# Patient Record
Sex: FEMALE
[De-identification: names, ages, dates, MRNs, and addresses within clinical notes are randomized per-mention and may not be internally consistent; named-entity substitution may affect disease eponyms.]

---

## 2023-01-31 ENCOUNTER — APPOINTMENT (OUTPATIENT)
Dept: GERIATRICS | Facility: CLINIC | Age: 83
End: 2023-01-31
Payer: MEDICARE

## 2023-01-31 VITALS
HEART RATE: 71 BPM | SYSTOLIC BLOOD PRESSURE: 199 MMHG | DIASTOLIC BLOOD PRESSURE: 92 MMHG | OXYGEN SATURATION: 98 % | HEIGHT: 57 IN | BODY MASS INDEX: 25.24 KG/M2 | TEMPERATURE: 97.2 F | WEIGHT: 117 LBS | RESPIRATION RATE: 16 BRPM

## 2023-01-31 DIAGNOSIS — Z86.79 PERSONAL HISTORY OF OTHER DISEASES OF THE CIRCULATORY SYSTEM: ICD-10-CM

## 2023-01-31 DIAGNOSIS — Z80.1 FAMILY HISTORY OF MALIGNANT NEOPLASM OF TRACHEA, BRONCHUS AND LUNG: ICD-10-CM

## 2023-01-31 PROCEDURE — 99204 OFFICE O/P NEW MOD 45 MIN: CPT | Mod: GC

## 2023-01-31 NOTE — REVIEW OF SYSTEMS
[Eyesight Problems] : eyesight problems [Loss Of Hearing] : hearing loss [As Noted in HPI] : as noted in HPI [Negative] : Heme/Lymph [FreeTextEntry3] : W

## 2023-01-31 NOTE — REASON FOR VISIT
[Initial Evaluation] : an initial evaluation [Family Member] : family member [FreeTextEntry1] : Establish care [FreeTextEntry2] : Isaias Howell

## 2023-01-31 NOTE — ASSESSMENT
[FreeTextEntry1] : Check blood work and CT head without contrast \par RTO in 2 weeks for cognitive evaluation and blood pressure check. Address advanced directions again at next visit. Daughter reports history of refusal in preventative health studies and vaccinations - will continue to encourage.

## 2023-01-31 NOTE — END OF VISIT
[] : Fellow [Time Spent: ___ minutes] : I have spent [unfilled] minutes of time on the encounter. [FreeTextEntry3] : 82 year old woman w/ PMH as above presents for initial visit. First visit w/ physician in >10 years. Collateral hx from daughter, Lynda, who lives with patient. \par \par Visit prompted by episode of wandering a few weeks ago. Patient reportedly went for a walk and did not return. Police were called and she was found a few miles away.\par \par Reportedly has had increased confusion over the past year but has not seen physician for evaluation. \par \par Patient has no acute complaints today. \par \par Reviewed labs. Has Cr 1.38 w/ BUN 19. Suspect likely CKDIII, but no baseline for comparison. UA shows no evidence of glomerulonephritis or ATN, overall bland. Does have significantly elevated .\par \par Noted hypertensive urgency w/ SBP >180 but no clinical evidence of hypertensive emergency. Start norvasc 5 mg w/ goal 25% BP reduction. Will need home BP monitoring and close f/u in 1-2 weeks. Also will start statin therapy.\par \par Also rec to complete CT head. MRI brain is reasonable, but daughter concerned she will not be able to tolerate. Will revisit at f/u visit, when cognitive assessment to be completed.\par \par Short term f/u w/ Dr. Clancy. Plan of care reviewed w/ daughter in detail.

## 2023-01-31 NOTE — HISTORY OF PRESENT ILLNESS
[Completely Independent] : Completely independent. [Independent] : managing finances [] : Assistance needed with traveling/transport [No falls in past year] : Patient reported no falls in the past year [0] : 2) Feeling down, depressed, or hopeless: Not at all (0) [PHQ-2 Negative - No further assessment needed] : PHQ-2 Negative - No further assessment needed [Patient/Caregiver unclear of wishes] : , patient/caregiver unclear of wishes [FreeTextEntry1] : 82F with PMHX of HTN presents to hospitals care. She is accompanied by her daughter, Lynda, who provided collateral history. \par \par Her daughter reports that the patient has not seen a physician in >15 years. She never had any surgeries and does not take any medications.\par \par Ten days ago, the patient went for her daily walk and did not return home for over two hours - her daughter called the police and the patient was found 3 miles from home after she "got lost." Her blood pressure was "200/100" but patient refused to go to ED. Her daughter notes the patient's memory has gradually worsened over the years - more forgetful and difficulty with word finding. \par \par Over 20 years ago, the patient was told she had HTN - was started on medication (her daughter is unsure of the name) but her blood pressure would become "too low" - medications were later self-discontinued. \par \par SHx: She has lived with daughter, Lynda, over 25 years. Also has two sons, who are both not in [place]. Has six grandchildren. . 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 Never smoker. Denies alcohol use/illicit drug use. History of exposure to second hand smoke. [place]kin[place]. Her room in basement so the patient climbs stairs several times a day- no assistive device for ambulation. Not driving. \par \par FHx: Lung CA - Father/?Mother\par \par Immunizations: Daughter reports that she received one dose of the Pfizer Covid vaccine - refused further doses.  [BreastSonogramDate] : Due [PapSmeardate] : Due [BoneDensityDate] : Due [ColonoscopyDate] : Due [TextBox_19] : Daughter reports she never had one [FreeTextEntry4] : Daughter reports she had one "many years ago." [Driving Concerns] : not driving or driving without noted concerns [de-identified] : She does not drive [AdvancecareDate] : 01/23

## 2023-01-31 NOTE — PHYSICAL EXAM
[Alert] : alert [No Acute Distress] : in no acute distress [Sclera] : the sclera and conjunctiva were normal [EOMI] : extraocular movements were intact [Normal Oral Mucosa] : normal oral mucosa [No Oral Pallor] : no oral pallor [Normal Outer Ear/Nose] : the ears and nose were normal in appearance [Both Tympanic Membranes Were Examined] : both tympanic membranes were normal [Normal Lips/Gums] : the lips and gums were normal [Oropharynx] : the oropharynx was normal [Normal Appearance] : the appearance of the neck was normal [Supple] : the neck was supple [No Respiratory Distress] : no respiratory distress [No Acc Muscle Use] : no accessory muscle use [Respiration, Rhythm And Depth] : normal respiratory rhythm and effort [Auscultation Breath Sounds / Voice Sounds] : lungs were clear to auscultation bilaterally [Normal S1, S2] : normal S1 and S2 [Murmurs] : no murmurs [Heart Rate And Rhythm] : heart rate was normal and rhythm regular [Edema] : edema was not present [Pedal Pulses Normal] : the pedal pulses are present [Bowel Sounds] : normal bowel sounds [Abdomen Tenderness] : non-tender [Abdomen Soft] : soft [Cervical Lymph Nodes Enlarged Posterior Bilaterally] : posterior cervical [Supraclavicular Lymph Nodes Enlarged Bilaterally] : supraclavicular [Cervical Lymph Nodes Enlarged Anterior Bilaterally] : anterior cervical, supraclavicular [Axillary Lymph Nodes Enlarged Bilaterally] : axillary [No Spinal Tenderness] : no spinal tenderness [Normal Gait] : normal gait [No Clubbing, Cyanosis] : no clubbing or cyanosis of the fingernails [Involuntary Movements] : no involuntary movements were seen [Motor Tone] : muscle strength and tone were normal [Normal Color / Pigmentation] : normal skin color and pigmentation [No Focal Deficits] : no focal deficits [Normal Affect] : the affect was normal [Normal Mood] : the mood was normal [Normal Hearing] : hearing was not normal [FreeTextEntry1] : Left central retinal vein swelling  [de-identified] : Hard of hearing - no cerumen impaction

## 2023-02-02 ENCOUNTER — TRANSCRIPTION ENCOUNTER (OUTPATIENT)
Age: 83
End: 2023-02-02

## 2023-02-02 LAB
ALBUMIN SERPL ELPH-MCNC: 4 G/DL
ALP BLD-CCNC: 64 U/L
ALT SERPL-CCNC: 18 U/L
ANION GAP SERPL CALC-SCNC: 12 MMOL/L
APPEARANCE: CLEAR
AST SERPL-CCNC: 20 U/L
BASOPHILS # BLD AUTO: 0.13 K/UL
BASOPHILS NFR BLD AUTO: 1.8 %
BILIRUB SERPL-MCNC: 0.5 MG/DL
BILIRUBIN URINE: NEGATIVE
BLOOD URINE: NEGATIVE
BUN SERPL-MCNC: 19 MG/DL
CALCIUM SERPL-MCNC: 9.6 MG/DL
CHLORIDE SERPL-SCNC: 103 MMOL/L
CHOLEST SERPL-MCNC: 264 MG/DL
CO2 SERPL-SCNC: 22 MMOL/L
COLOR: YELLOW
CREAT SERPL-MCNC: 1.38 MG/DL
EGFR: 38 ML/MIN/1.73M2
EOSINOPHIL # BLD AUTO: 0.34 K/UL
EOSINOPHIL NFR BLD AUTO: 4.7 %
FOLATE SERPL-MCNC: 17.2 NG/ML
GLUCOSE QUALITATIVE U: NEGATIVE
GLUCOSE SERPL-MCNC: 148 MG/DL
HCT VFR BLD CALC: 40.9 %
HDLC SERPL-MCNC: 53 MG/DL
HGB BLD-MCNC: 13.6 G/DL
IMM GRANULOCYTES NFR BLD AUTO: 0.3 %
KETONES URINE: NEGATIVE
LDLC SERPL CALC-MCNC: 185 MG/DL
LEUKOCYTE ESTERASE URINE: NEGATIVE
LYMPHOCYTES # BLD AUTO: 1.78 K/UL
LYMPHOCYTES NFR BLD AUTO: 24.5 %
MAN DIFF?: NORMAL
MCHC RBC-ENTMCNC: 28.7 PG
MCHC RBC-ENTMCNC: 33.3 GM/DL
MCV RBC AUTO: 86.3 FL
MONOCYTES # BLD AUTO: 0.71 K/UL
MONOCYTES NFR BLD AUTO: 9.8 %
NEUTROPHILS # BLD AUTO: 4.3 K/UL
NEUTROPHILS NFR BLD AUTO: 58.9 %
NITRITE URINE: NEGATIVE
NONHDLC SERPL-MCNC: 211 MG/DL
PH URINE: 5.5
PLATELET # BLD AUTO: 446 K/UL
POTASSIUM SERPL-SCNC: 4.5 MMOL/L
PROT SERPL-MCNC: 7.3 G/DL
PROTEIN URINE: NORMAL
RBC # BLD: 4.74 M/UL
RBC # FLD: 13.1 %
SODIUM SERPL-SCNC: 137 MMOL/L
SPECIFIC GRAVITY URINE: 1.02
TRIGL SERPL-MCNC: 132 MG/DL
TSH SERPL-ACNC: 0.97 UIU/ML
UROBILINOGEN URINE: NORMAL
VIT B12 SERPL-MCNC: 482 PG/ML
WBC # FLD AUTO: 7.28 K/UL

## 2023-02-21 ENCOUNTER — APPOINTMENT (OUTPATIENT)
Dept: GERIATRICS | Facility: CLINIC | Age: 83
End: 2023-02-21
Payer: MEDICARE

## 2023-02-21 VITALS
RESPIRATION RATE: 16 BRPM | OXYGEN SATURATION: 98 % | WEIGHT: 16.5 LBS | HEIGHT: 57 IN | BODY MASS INDEX: 3.56 KG/M2 | HEART RATE: 80 BPM | SYSTOLIC BLOOD PRESSURE: 168 MMHG | TEMPERATURE: 97.2 F | DIASTOLIC BLOOD PRESSURE: 73 MMHG

## 2023-02-21 PROCEDURE — 99483 ASSMT & CARE PLN PT COG IMP: CPT | Mod: GC

## 2023-08-08 ENCOUNTER — APPOINTMENT (OUTPATIENT)
Dept: GERIATRICS | Facility: CLINIC | Age: 83
End: 2023-08-08

## 2023-08-15 ENCOUNTER — APPOINTMENT (OUTPATIENT)
Dept: GERIATRICS | Facility: CLINIC | Age: 83
End: 2023-08-15
Payer: MEDICARE

## 2023-08-15 VITALS
RESPIRATION RATE: 16 BRPM | BODY MASS INDEX: 24.92 KG/M2 | OXYGEN SATURATION: 97 % | HEIGHT: 57 IN | SYSTOLIC BLOOD PRESSURE: 184 MMHG | WEIGHT: 115.5 LBS | DIASTOLIC BLOOD PRESSURE: 73 MMHG | HEART RATE: 79 BPM | TEMPERATURE: 97.8 F

## 2023-08-15 VITALS — DIASTOLIC BLOOD PRESSURE: 78 MMHG | SYSTOLIC BLOOD PRESSURE: 148 MMHG

## 2023-08-15 DIAGNOSIS — I10 ESSENTIAL (PRIMARY) HYPERTENSION: ICD-10-CM

## 2023-08-15 DIAGNOSIS — Z13.820 ENCOUNTER FOR SCREENING FOR OSTEOPOROSIS: ICD-10-CM

## 2023-08-15 PROCEDURE — 99214 OFFICE O/P EST MOD 30 MIN: CPT | Mod: GC

## 2023-08-16 LAB
ALBUMIN SERPL ELPH-MCNC: 4.4 G/DL
ALP BLD-CCNC: 80 U/L
ALT SERPL-CCNC: 15 U/L
ANION GAP SERPL CALC-SCNC: 16 MMOL/L
AST SERPL-CCNC: 21 U/L
BILIRUB SERPL-MCNC: 0.7 MG/DL
BUN SERPL-MCNC: 17 MG/DL
CALCIUM SERPL-MCNC: 9.4 MG/DL
CHLORIDE SERPL-SCNC: 102 MMOL/L
CHOLEST SERPL-MCNC: 160 MG/DL
CO2 SERPL-SCNC: 21 MMOL/L
CREAT SERPL-MCNC: 1.28 MG/DL
EGFR: 42 ML/MIN/1.73M2
ESTIMATED AVERAGE GLUCOSE: 146 MG/DL
GLUCOSE SERPL-MCNC: 85 MG/DL
HBA1C MFR BLD HPLC: 6.7 %
HDLC SERPL-MCNC: 60 MG/DL
LDLC SERPL CALC-MCNC: 84 MG/DL
NONHDLC SERPL-MCNC: 100 MG/DL
POTASSIUM SERPL-SCNC: 4.1 MMOL/L
PROT SERPL-MCNC: 7.9 G/DL
SODIUM SERPL-SCNC: 139 MMOL/L
TRIGL SERPL-MCNC: 86 MG/DL

## 2023-08-16 NOTE — DATA REVIEWED
[FreeTextEntry1] : Reviewed recent notes, labs and imaging today. And updated patient's EMR. Discussed plan as below with patient and family

## 2023-08-16 NOTE — HISTORY OF PRESENT ILLNESS
[No falls in past year] : Patient reported no falls in the past year [Completely Independent] : Completely independent. [Patient is independent with] : bathing [Independent] : managing finances [] : Assistance needed with traveling/transport [0] : 2) Feeling down, depressed, or hopeless: Not at all (0) [PHQ-2 Negative - No further assessment needed] : PHQ-2 Negative - No further assessment needed [Patient/Caregiver unclear of wishes] : , patient/caregiver unclear of wishes [FreeTextEntry1] : 83F with PMHX of HTN, primary hypercholesterolemia, Hyperglycemia,  Dementia presents for cognitive evaluation. She is accompanied by her daughter, Lynda, who provided collateral history. She has been doing better. She is more responsive to conversations and activities. She goes out, has air tag and apple watch around the neighborhood.  No complaints. She does not date or location. She refused ct head, vaccination, and other interventions.   She has blood pressure usually 140/150s systolic, missed 3-4 doses in the last month. Only taking 5mg of amlodipine.   Dementia: In Jan 2023, the patient went for her daily walk and did not return home for over two hours - her daughter called the police and the patient was found 3 miles from home after she "got lost." Her blood pressure was "200/100" but patient refused to go to ED. Her daughter notes the patient's memory has gradually worsened over the years - more forgetful and difficulty with word finding. Since starting blood pressure medication, her daughter reports her cognition and mood have improved. She had a cognitive evalaution in Feb 2023, with [facility] 10/30.   HTN: Over 20 years ago, the patient was told she had HTN - was started on medication (her daughter is unsure of the name) but her blood pressure would become "too low" - medications were later self-discontinued. Amlodipine was started ealier this year. patient reports she has been mostly compliant. Her daughter checks blood pressure at home - SBP usually >140.   HLD: Patient has been on Lipitor. No side effects. Amenable for rechecking labs.   SHx: She has lived with daughter, Lynda, over 25 years. Also has two sons, who are both not in [place]. Has six grandchildren. . 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 Never smoker. Denies alcohol use/illicit drug use. History of exposure to second hand smoke. Luxem[place]ish. Her room in basement so the patient climbs stairs several times a day- no assistive device for ambulation. Not driving.  FHx: Lung CA - Father/?Mother  Immunizations: Daughter reports that she received one dose of the Pfizer Covid vaccine - refused further doses. [BreastSonogramDate] : Due [PapSmeardate] : Due [BoneDensityDate] : Due [ColonoscopyDate] : Due [TextBox_19] : Daughter reports she never had one [FreeTextEntry4] : Daughter reports she had one "many years ago." [Driving Concerns] : not driving or driving without noted concerns [de-identified] : She does not drive [AdvancecareDate] : 01/23

## 2023-08-16 NOTE — ASSESSMENT
[FreeTextEntry1] : #HM  Encouraged healthy low salt meals and snacks, and physical activity as tolerated for weight maintenance and blood pressure controlled. Declines all vaccine. Lab ordered as below.  Orders and referral provided as below. Patient counseled on ABCDE's of skin cancer.   Fall prevention counseling provided today.  Patient to avoid clutter, loose rugs, wet floors, ice, stairs without railing, and areas with poor lighting. Add bathroom railing and a bathmat for fall prevention.  Patient is here for episodic care. All patient questions answered today and understood by patient. Henceforth, Patient to schedule follow up 3-6 months, or if new symptoms, questions, renewals or health concerns.

## 2023-08-16 NOTE — END OF VISIT
[] : Fellow [FreeTextEntry3] : 83 year old woman w/ PMH as above presents for f/u visit. Has been some stabilization since initial visits. BP is better with adherence to norvasc. Still borderline, but holding on adding additional medication today. Has been adherent w/ lipitor reportedly.   Lives w/ daughter, who assists w/ most IADLs. Patient remains independent in ADLs.   She remains resistant to many HCM concerns. Has significant hearing loss but refuses hearing aids (granddaughter is an audiologist). Declines all vaccinations. Did give DEXA referral.  Labs reviewed. Noted ?hemoconcentration. There is a slight elevation in Plt and monocytes. Otherwise ok. Will repeat at next visit.  RTC 2-3 months

## 2023-08-16 NOTE — PHYSICAL EXAM
[Alert] : alert [Sclera] : the sclera and conjunctiva were normal [EOMI] : extraocular movements were intact [Normal Oral Mucosa] : normal oral mucosa [No Oral Pallor] : no oral pallor [Normal Outer Ear/Nose] : the ears and nose were normal in appearance [Both Tympanic Membranes Were Examined] : both tympanic membranes were normal [Normal Lips/Gums] : the lips and gums were normal [Oropharynx] : the oropharynx was normal [Normal Appearance] : the appearance of the neck was normal [Supple] : the neck was supple [No Respiratory Distress] : no respiratory distress [No Acc Muscle Use] : no accessory muscle use [Respiration, Rhythm And Depth] : normal respiratory rhythm and effort [Auscultation Breath Sounds / Voice Sounds] : lungs were clear to auscultation bilaterally [Normal S1, S2] : normal S1 and S2 [Murmurs] : no murmurs [Heart Rate And Rhythm] : heart rate was normal and rhythm regular [Edema] : edema was not present [Pedal Pulses Normal] : the pedal pulses are present [Bowel Sounds] : normal bowel sounds [Abdomen Tenderness] : non-tender [Abdomen Soft] : soft [Cervical Lymph Nodes Enlarged Posterior Bilaterally] : posterior cervical [Supraclavicular Lymph Nodes Enlarged Bilaterally] : supraclavicular [Cervical Lymph Nodes Enlarged Anterior Bilaterally] : anterior cervical, supraclavicular [Axillary Lymph Nodes Enlarged Bilaterally] : axillary [No Spinal Tenderness] : no spinal tenderness [No Clubbing, Cyanosis] : no clubbing or cyanosis of the fingernails [Involuntary Movements] : no involuntary movements were seen [Motor Tone] : muscle strength and tone were normal [Normal Color / Pigmentation] : normal skin color and pigmentation [No Focal Deficits] : no focal deficits [Normal Affect] : the affect was normal [Normal Mood] : the mood was normal [FreeTextEntry1] : Left central retinal vein swelling  [Normal Hearing] : hearing was not normal [Normal Gait] : abnormal gait [de-identified] : Hard of hearing - no cerumen impaction

## 2023-11-14 ENCOUNTER — APPOINTMENT (OUTPATIENT)
Dept: GERIATRICS | Facility: CLINIC | Age: 83
End: 2023-11-14
Payer: MEDICARE

## 2023-11-14 VITALS
OXYGEN SATURATION: 98 % | RESPIRATION RATE: 16 BRPM | WEIGHT: 118 LBS | SYSTOLIC BLOOD PRESSURE: 156 MMHG | DIASTOLIC BLOOD PRESSURE: 80 MMHG | HEIGHT: 57 IN | TEMPERATURE: 97.2 F | BODY MASS INDEX: 25.46 KG/M2 | HEART RATE: 81 BPM

## 2023-11-14 DIAGNOSIS — Z71.89 OTHER SPECIFIED COUNSELING: ICD-10-CM

## 2023-11-14 DIAGNOSIS — E78.5 HYPERLIPIDEMIA, UNSPECIFIED: ICD-10-CM

## 2023-11-14 PROCEDURE — 99214 OFFICE O/P EST MOD 30 MIN: CPT

## 2023-11-14 PROCEDURE — 99497 ADVNCD CARE PLAN 30 MIN: CPT

## 2023-11-15 VITALS — SYSTOLIC BLOOD PRESSURE: 140 MMHG | DIASTOLIC BLOOD PRESSURE: 78 MMHG

## 2023-11-15 PROBLEM — Z71.89 ADVANCED CARE PLANNING/COUNSELING DISCUSSION: Status: ACTIVE | Noted: 2023-02-21

## 2023-11-15 PROBLEM — E78.5 HYPERLIPIDEMIA: Status: ACTIVE | Noted: 2023-02-02

## 2023-11-16 ENCOUNTER — NON-APPOINTMENT (OUTPATIENT)
Age: 83
End: 2023-11-16

## 2023-11-16 DIAGNOSIS — D72.829 ELEVATED WHITE BLOOD CELL COUNT, UNSPECIFIED: ICD-10-CM

## 2023-11-16 LAB
ALBUMIN SERPL ELPH-MCNC: 4.2 G/DL
ALP BLD-CCNC: 92 U/L
ALT SERPL-CCNC: 15 U/L
ANION GAP SERPL CALC-SCNC: 17 MMOL/L
AST SERPL-CCNC: 17 U/L
BASOPHILS # BLD AUTO: 0.14 K/UL
BASOPHILS NFR BLD AUTO: 1.1 %
BILIRUB SERPL-MCNC: 0.9 MG/DL
BUN SERPL-MCNC: 16 MG/DL
CALCIUM SERPL-MCNC: 9.6 MG/DL
CHLORIDE SERPL-SCNC: 104 MMOL/L
CHOLEST SERPL-MCNC: 157 MG/DL
CO2 SERPL-SCNC: 19 MMOL/L
CREAT SERPL-MCNC: 1.28 MG/DL
EGFR: 42 ML/MIN/1.73M2
EOSINOPHIL # BLD AUTO: 0.36 K/UL
EOSINOPHIL NFR BLD AUTO: 2.9 %
ESTIMATED AVERAGE GLUCOSE: 148 MG/DL
FOLATE SERPL-MCNC: 17.4 NG/ML
GLUCOSE SERPL-MCNC: 138 MG/DL
HBA1C MFR BLD HPLC: 6.8 %
HCT VFR BLD CALC: 40 %
HDLC SERPL-MCNC: 57 MG/DL
HGB BLD-MCNC: 13.1 G/DL
IMM GRANULOCYTES NFR BLD AUTO: 0.4 %
LDLC SERPL CALC-MCNC: 82 MG/DL
LYMPHOCYTES # BLD AUTO: 2.32 K/UL
LYMPHOCYTES NFR BLD AUTO: 19 %
MAN DIFF?: NORMAL
MCHC RBC-ENTMCNC: 28.3 PG
MCHC RBC-ENTMCNC: 32.8 GM/DL
MCV RBC AUTO: 86.4 FL
MONOCYTES # BLD AUTO: 1.24 K/UL
MONOCYTES NFR BLD AUTO: 10.1 %
NEUTROPHILS # BLD AUTO: 8.13 K/UL
NEUTROPHILS NFR BLD AUTO: 66.5 %
NONHDLC SERPL-MCNC: 100 MG/DL
PLATELET # BLD AUTO: 390 K/UL
POTASSIUM SERPL-SCNC: 4.3 MMOL/L
PROT SERPL-MCNC: 8 G/DL
RBC # BLD: 4.63 M/UL
RBC # FLD: 13.5 %
SODIUM SERPL-SCNC: 139 MMOL/L
TRIGL SERPL-MCNC: 100 MG/DL
VIT B12 SERPL-MCNC: 392 PG/ML
WBC # FLD AUTO: 12.24 K/UL

## 2024-01-24 RX ORDER — ATORVASTATIN CALCIUM 40 MG/1
40 TABLET, FILM COATED ORAL
Qty: 90 | Refills: 1 | Status: ACTIVE | COMMUNITY
Start: 2023-02-02 | End: 1900-01-01

## 2024-02-20 ENCOUNTER — APPOINTMENT (OUTPATIENT)
Dept: GERIATRICS | Facility: CLINIC | Age: 84
End: 2024-02-20
Payer: MEDICARE

## 2024-02-20 ENCOUNTER — NON-APPOINTMENT (OUTPATIENT)
Age: 84
End: 2024-02-20

## 2024-02-20 VITALS
HEIGHT: 57 IN | DIASTOLIC BLOOD PRESSURE: 63 MMHG | SYSTOLIC BLOOD PRESSURE: 159 MMHG | TEMPERATURE: 97.5 F | BODY MASS INDEX: 25.89 KG/M2 | OXYGEN SATURATION: 95 % | RESPIRATION RATE: 16 BRPM | HEART RATE: 85 BPM | WEIGHT: 120 LBS

## 2024-02-20 DIAGNOSIS — E11.9 TYPE 2 DIABETES MELLITUS W/OUT COMPLICATIONS: ICD-10-CM

## 2024-02-20 DIAGNOSIS — N18.32 CHRONIC KIDNEY DISEASE, STAGE 3B: ICD-10-CM

## 2024-02-20 DIAGNOSIS — Z78.9 OTHER SPECIFIED HEALTH STATUS: ICD-10-CM

## 2024-02-20 DIAGNOSIS — E11.59 TYPE 2 DIABETES MELLITUS WITH OTHER CIRCULATORY COMPLICATIONS: ICD-10-CM

## 2024-02-20 DIAGNOSIS — Z63.5 DISRUPTION OF FAMILY BY SEPARATION AND DIVORCE: ICD-10-CM

## 2024-02-20 DIAGNOSIS — F03.90 UNSPECIFIED DEMENTIA W/OUT BEHAVIORAL DISTURBANCE: ICD-10-CM

## 2024-02-20 DIAGNOSIS — Z00.00 ENCOUNTER FOR GENERAL ADULT MEDICAL EXAMINATION W/OUT ABNORMAL FINDINGS: ICD-10-CM

## 2024-02-20 DIAGNOSIS — Z23 ENCOUNTER FOR IMMUNIZATION: ICD-10-CM

## 2024-02-20 DIAGNOSIS — Z87.898 PERSONAL HISTORY OF OTHER SPECIFIED CONDITIONS: ICD-10-CM

## 2024-02-20 DIAGNOSIS — I15.2 TYPE 2 DIABETES MELLITUS WITH OTHER CIRCULATORY COMPLICATIONS: ICD-10-CM

## 2024-02-20 PROCEDURE — 99215 OFFICE O/P EST HI 40 MIN: CPT | Mod: GC

## 2024-02-20 RX ORDER — SITAGLIPTIN 25 MG/1
25 TABLET, FILM COATED ORAL
Qty: 90 | Refills: 1 | Status: ACTIVE | COMMUNITY
Start: 2024-02-20 | End: 1900-01-01

## 2024-02-20 RX ORDER — AMLODIPINE BESYLATE 10 MG/1
10 TABLET ORAL
Qty: 90 | Refills: 1 | Status: ACTIVE | COMMUNITY
Start: 2023-01-31 | End: 1900-01-01

## 2024-02-20 SDOH — SOCIAL STABILITY - SOCIAL INSECURITY: DISRUPTION OF FAMILY BY SEPARATION AND DIVORCE: Z63.5

## 2024-02-21 LAB
ALBUMIN SERPL ELPH-MCNC: 4.1 G/DL
ALP BLD-CCNC: 91 U/L
ALT SERPL-CCNC: 29 U/L
ANION GAP SERPL CALC-SCNC: 15 MMOL/L
APPEARANCE: CLEAR
AST SERPL-CCNC: 33 U/L
BACTERIA: NEGATIVE /HPF
BASOPHILS # BLD AUTO: 0.17 K/UL
BASOPHILS NFR BLD AUTO: 1.8 %
BILIRUB SERPL-MCNC: 0.6 MG/DL
BILIRUBIN URINE: NEGATIVE
BLOOD URINE: NEGATIVE
BUN SERPL-MCNC: 15 MG/DL
CALCIUM SERPL-MCNC: 9.7 MG/DL
CAST: 1 /LPF
CHLORIDE SERPL-SCNC: 101 MMOL/L
CO2 SERPL-SCNC: 20 MMOL/L
COLOR: YELLOW
CREAT SERPL-MCNC: 1.22 MG/DL
EGFR: 44 ML/MIN/1.73M2
EOSINOPHIL # BLD AUTO: 0.33 K/UL
EOSINOPHIL NFR BLD AUTO: 3.5 %
EPITHELIAL CELLS: 2 /HPF
ESTIMATED AVERAGE GLUCOSE: 157 MG/DL
GLUCOSE QUALITATIVE U: >=1000 MG/DL
GLUCOSE SERPL-MCNC: 291 MG/DL
HBA1C MFR BLD HPLC: 7.1 %
HCT VFR BLD CALC: 38.3 %
HGB BLD-MCNC: 12.6 G/DL
IMM GRANULOCYTES NFR BLD AUTO: 0.3 %
KETONES URINE: ABNORMAL MG/DL
LEUKOCYTE ESTERASE URINE: NEGATIVE
LYMPHOCYTES # BLD AUTO: 2.3 K/UL
LYMPHOCYTES NFR BLD AUTO: 24.5 %
MAN DIFF?: NORMAL
MCHC RBC-ENTMCNC: 28.3 PG
MCHC RBC-ENTMCNC: 32.9 GM/DL
MCV RBC AUTO: 85.9 FL
MICROSCOPIC-UA: NORMAL
MONOCYTES # BLD AUTO: 1.11 K/UL
MONOCYTES NFR BLD AUTO: 11.8 %
NEUTROPHILS # BLD AUTO: 5.45 K/UL
NEUTROPHILS NFR BLD AUTO: 58.1 %
NITRITE URINE: NEGATIVE
PH URINE: 5.5
PLATELET # BLD AUTO: 459 K/UL
POTASSIUM SERPL-SCNC: 4.2 MMOL/L
PROT SERPL-MCNC: 7.7 G/DL
PROTEIN URINE: 30 MG/DL
RBC # BLD: 4.46 M/UL
RBC # FLD: 13.3 %
RED BLOOD CELLS URINE: 0 /HPF
SODIUM SERPL-SCNC: 136 MMOL/L
SPECIFIC GRAVITY URINE: 1.03
TSH SERPL-ACNC: 0.55 UIU/ML
URATE SERPL-MCNC: 4.9 MG/DL
UROBILINOGEN URINE: 0.2 MG/DL
WBC # FLD AUTO: 9.39 K/UL
WHITE BLOOD CELLS URINE: 1 /HPF

## 2024-02-22 ENCOUNTER — NON-APPOINTMENT (OUTPATIENT)
Age: 84
End: 2024-02-22

## 2024-02-22 LAB — BACTERIA UR CULT: NORMAL

## 2024-02-26 NOTE — END OF VISIT
[] : Fellow [Time Spent: ___ minutes] : I have spent [unfilled] minutes of time on the encounter. [FreeTextEntry3] : Agree with the Beulah assessment and plan. I have reviewed and edited the note above as needed. Will monitor A1c- currently at goal <7  If A1c worsening, agree w/ Dr. Perez's recc to initiate Chance.

## 2024-02-26 NOTE — ASSESSMENT
[FreeTextEntry1] : 1) HM - Continue with healthy meals and snacks, and  physical activity as tolerated for weight maintenance. Declines all vaccines . Lab ordered as below.  Orders and referral provided as below. HCP form in the chart. Daughter will discuss end of life care with patient and discuss MOLST in next appointment.

## 2024-02-26 NOTE — HISTORY OF PRESENT ILLNESS
[PMH Reviewed and Updated] : past medical history reviewed and updated [PSH Reviewed and Updated] : past surgical history reviewed and updated [Family History Reviewed and Updated] : family history reviewed and updated [Medication and Allergies Reconciled] : medication and allergies reconciled [No falls in past year] : Patient reported no falls in the past year [Little interest or pleasure doing things] : 1) Little interest or pleasure doing things [Feeling down, depressed, or hopeless] : 2) Feeling down, depressed, or hopeless [PHQ-2 Negative - No further assessment needed] : PHQ-2 Negative - No further assessment needed [Mild] : Stage: Mild [Stable] : Status: Stable [Memory Lapses Or Loss] : stable memory impairment [Patient Observed To Be Agitated] : stable agitation [Hostility Toward Caregivers] : denies aggression [Sleep Disturbances] : stable sleep disturbances [Fixed Beliefs Contradicted By Reality (Delusions)] : denies delusions [Difficulty Finding Desired Words] : denies difficulty finding desired words [None] : The patient is currently asymptomatic [With Patient/Caregiver] : , with patient/caregiver [Reviewed no changes] : Reviewed, no changes [Designated Healthcare Proxy] : Designated healthcare proxy [Relationship: ___] : Relationship: [unfilled] [I will adhere to the patient's wishes.] : I will adhere to the patient's wishes. [Time Spent: ___ minutes] : Time Spent: [unfilled] minutes [Patient is independent with] : bathing [Independent] : transferring/mobility [Full assistance needed] : Assistance needed managing medications [] : Assistance needed managing finances. [FAST Score: ____] : Functional Assessment Scale (FAST) Score: [unfilled] [Smoke Detector] : smoke detector [Grab Bars] : grab bars [Wears Seat Belt] : wears seat belt [0] : 0 [___ Daughters] : [unfilled] daughter(s) [Compliant with medications] : compliant with medications [Children] : children [Does not drive] : does not drive [FreeTextEntry1] : 84 y/o F with PMHX of HTN, primary hypercholesterolemia, diet controlled T2DM, mild Dementia ([facility] 10/30), hearing loss (non adherent to hearing aids) presents to the practice for a medicare annual wellness visit.   She is accompanied by her daughter, Lynda, who provided collateral history.  Dementia: In January 2023, the patient went for her daily walk and did not return home for over two hours - her daughter called the police and the patient was found 3 miles from home after she "got lost." Her blood pressure was "200/100" but patient refused to go to ED. Daughter Lynda stated the patient's memory has gradually worsened over the years - more forgetful and difficulty with word finding. Since starting blood pressure medication, her daughter reports her cognition and mood have improved. She had a cognitive evaluation in Feb 2023, with [facility] 10/30. She has been doing better. She is more responsive to conversations and activities. She goes out, has air tag and apple watch around the neighborhood. No complaints. CT imaging was not pursued at last visit. SW was declined as well. Independent with most ADL's, needs assistance from daughter with IADL's.  She has lived with daughter, Lynda, over 25 years. Also has two sons, who are both not in [place]. Has six grandchildren. . 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 Never smoker. Denies alcohol use/illicit drug use. History of exposure to second hand smoke. [place]. Her room in basement so the patient climbs stairs several times a day- no assistive device for ambulation. Not driving.  Behvioral symptoms of dementia: none  She is losing track of time. She picks her own clothing appropiatetly. Assistance with IADLS, but not adls. Watches tv, game shows. She walks aroung the neighbors.   HTN: Over 20 year hx of HTN, patient had selfdiscontinued medication. In 2023, was started on amlodipine . Daughter monitors bp at home.   CKD stage 3b: Likely from chronic HTN disease. GFR 42, Cr 1.28 from August 2023, stable.  Monitor on metabolic panel serially.   HLD: Patient has been on Lipitor. No side effects. LDL of 82 on Nov 16 2023.   Osteoporosis: DEXA was ordered  last year, patient has not done this. She has had no apparent history of osteoporosis or osteopenia as per daughter and chart review.  DM: A1c 6.7%  and 6.8% from 2023.  Hearing loss: Chronic hearing loss, resistant to wearing hearing aids. Pocket talker was introduced and she utilized it but we are not sure if she is appreciating it. Advised daughter to get one for home just in case there is an emergency and she needs to communicate with her. [de-identified] : some assistance, but mostly independent.  [FreeTextEntry7] : takes from weekly pill box am and pm [[id number]] : 0 [AdvancecareDate] : 11/14/2023 [FreeTextEntry4] : Introduced MOLST form, explained need to have MOLST on board at least for CPR and breathing. Daughter wants to explore with family first before filling out. HCP form filled out and uploaded

## 2024-07-29 ENCOUNTER — TRANSCRIPTION ENCOUNTER (OUTPATIENT)
Age: 84
End: 2024-07-29

## 2024-09-17 ENCOUNTER — APPOINTMENT (OUTPATIENT)
Dept: GERIATRICS | Facility: CLINIC | Age: 84
End: 2024-09-17
Payer: MEDICARE

## 2024-09-17 VITALS
BODY MASS INDEX: 25.54 KG/M2 | HEART RATE: 94 BPM | TEMPERATURE: 96.1 F | DIASTOLIC BLOOD PRESSURE: 72 MMHG | WEIGHT: 118 LBS | SYSTOLIC BLOOD PRESSURE: 160 MMHG | OXYGEN SATURATION: 96 % | RESPIRATION RATE: 15 BRPM

## 2024-09-17 VITALS — SYSTOLIC BLOOD PRESSURE: 140 MMHG | DIASTOLIC BLOOD PRESSURE: 70 MMHG

## 2024-09-17 DIAGNOSIS — R42 DIZZINESS AND GIDDINESS: ICD-10-CM

## 2024-09-17 DIAGNOSIS — I15.2 TYPE 2 DIABETES MELLITUS WITH OTHER CIRCULATORY COMPLICATIONS: ICD-10-CM

## 2024-09-17 DIAGNOSIS — D75.839 THROMBOCYTOSIS, UNSPECIFIED: ICD-10-CM

## 2024-09-17 DIAGNOSIS — N18.32 CHRONIC KIDNEY DISEASE, STAGE 3B: ICD-10-CM

## 2024-09-17 DIAGNOSIS — E11.59 TYPE 2 DIABETES MELLITUS WITH OTHER CIRCULATORY COMPLICATIONS: ICD-10-CM

## 2024-09-17 DIAGNOSIS — F03.90 UNSPECIFIED DEMENTIA W/OUT BEHAVIORAL DISTURBANCE: ICD-10-CM

## 2024-09-17 DIAGNOSIS — E11.9 TYPE 2 DIABETES MELLITUS W/OUT COMPLICATIONS: ICD-10-CM

## 2024-09-17 DIAGNOSIS — E78.5 HYPERLIPIDEMIA, UNSPECIFIED: ICD-10-CM

## 2024-09-17 PROCEDURE — 99215 OFFICE O/P EST HI 40 MIN: CPT | Mod: GC

## 2024-09-17 RX ORDER — MV-MIN/FOLIC/K1/LYCOPEN/LUTEIN 200-15 MCG
TABLET ORAL DAILY
Refills: 0 | Status: ACTIVE | COMMUNITY
Start: 2024-09-17

## 2024-09-17 NOTE — PHYSICAL EXAM
[Alert] : alert [EOMI] : extraocular movements were intact [Normal Outer Ear/Nose] : the ears and nose were normal in appearance [Normal Appearance] : the appearance of the neck was normal [Supple] : the neck was supple [No Respiratory Distress] : no respiratory distress [No Acc Muscle Use] : no accessory muscle use [Respiration, Rhythm And Depth] : normal respiratory rhythm and effort [Auscultation Breath Sounds / Voice Sounds] : lungs were clear to auscultation bilaterally [Normal S1, S2] : normal S1 and S2 [Heart Rate And Rhythm] : heart rate was normal and rhythm regular [Edema] : edema was not present [Bowel Sounds] : normal bowel sounds [Abdomen Tenderness] : non-tender [Abdomen Soft] : soft [No Spinal Tenderness] : no spinal tenderness [No Clubbing, Cyanosis] : no clubbing or cyanosis of the fingernails [Involuntary Movements] : no involuntary movements were seen [Motor Tone] : muscle strength and tone were normal [Normal Color / Pigmentation] : normal skin color and pigmentation [Normal Turgor] : normal skin turgor [No Focal Deficits] : no focal deficits [Normal Affect] : the affect was normal [Normal Mood] : the mood was normal

## 2024-09-18 ENCOUNTER — NON-APPOINTMENT (OUTPATIENT)
Age: 84
End: 2024-09-18

## 2024-09-18 LAB
ALBUMIN SERPL ELPH-MCNC: 4.3 G/DL
ALP BLD-CCNC: 77 U/L
ALT SERPL-CCNC: 24 U/L
ANION GAP SERPL CALC-SCNC: 13 MMOL/L
AST SERPL-CCNC: 30 U/L
BASOPHILS # BLD AUTO: 0.15 K/UL
BASOPHILS NFR BLD AUTO: 1.6 %
BILIRUB SERPL-MCNC: 0.6 MG/DL
BUN SERPL-MCNC: 16 MG/DL
CALCIUM SERPL-MCNC: 9.8 MG/DL
CHLORIDE SERPL-SCNC: 105 MMOL/L
CO2 SERPL-SCNC: 21 MMOL/L
CREAT SERPL-MCNC: 1.32 MG/DL
EGFR: 40 ML/MIN/1.73M2
EOSINOPHIL # BLD AUTO: 0.45 K/UL
EOSINOPHIL NFR BLD AUTO: 4.7 %
ESTIMATED AVERAGE GLUCOSE: 131 MG/DL
GLUCOSE SERPL-MCNC: 99 MG/DL
HBA1C MFR BLD HPLC: 6.2 %
HCT VFR BLD CALC: 40 %
HGB BLD-MCNC: 13 G/DL
IMM GRANULOCYTES NFR BLD AUTO: 0.2 %
LYMPHOCYTES # BLD AUTO: 2.49 K/UL
LYMPHOCYTES NFR BLD AUTO: 26 %
MAN DIFF?: NORMAL
MCHC RBC-ENTMCNC: 28.6 PG
MCHC RBC-ENTMCNC: 32.5 GM/DL
MCV RBC AUTO: 88.1 FL
MONOCYTES # BLD AUTO: 1.4 K/UL
MONOCYTES NFR BLD AUTO: 14.6 %
NEUTROPHILS # BLD AUTO: 5.08 K/UL
NEUTROPHILS NFR BLD AUTO: 52.9 %
PLATELET # BLD AUTO: 415 K/UL
POTASSIUM SERPL-SCNC: 4.4 MMOL/L
PROT SERPL-MCNC: 7.8 G/DL
RBC # BLD: 4.54 M/UL
RBC # FLD: 13.6 %
SODIUM SERPL-SCNC: 139 MMOL/L
WBC # FLD AUTO: 9.59 K/UL

## 2024-09-19 NOTE — END OF VISIT
[] : Fellow [FreeTextEntry3] : 83 y/o F with significant hx of advanced dementia comes today for regular f/u. Will monitor BP- no changes made today. Will monitor Anemia likely in setting of CKD.  [Time Spent: ___ minutes] : I have spent [unfilled] minutes of time on the encounter which excludes teaching and separately reported services.

## 2024-09-19 NOTE — ASSESSMENT
[FreeTextEntry1] : 1) HM - Continue with healthy meals and snacks, and  physical activity as tolerated for weight maintenance. Declines all vaccines . Lab ordered as below.  Orders and referral provided as below. HCP form in the chart.  Labs today RTC in 3 months

## 2024-09-19 NOTE — REVIEW OF SYSTEMS
[Loss Of Hearing] : hearing loss [Dizziness] : dizziness [Negative] : Heme/Lymph [Fever] : no fever [Chills] : no chills [Feeling Poorly] : not feeling poorly [Feeling Tired] : not feeling tired [Sore Throat] : no sore throat [Heart Rate Is Slow] : the heart rate was not slow [Heart Rate Is Fast] : the heart rate was not fast [Chest Pain] : no chest pain [Palpitations] : no palpitations [Leg Claudication] : no intermittent leg claudication [Lower Ext Edema] : no lower extremity edema [Shortness Of Breath] : no shortness of breath [Wheezing] : no wheezing [Cough] : no cough [SOB on Exertion] : no shortness of breath during exertion [Abdominal Pain] : no abdominal pain [Vomiting] : no vomiting [Constipation] : no constipation [Diarrhea] : no diarrhea [Fainting] : no fainting [Limb Weakness] : no limb weakness [Difficulty Walking] : no difficulty walking

## 2024-09-19 NOTE — END OF VISIT
[] : Fellow [FreeTextEntry3] : 85 y/o F with significant hx of advanced dementia comes today for regular f/u. Will monitor BP- no changes made today. Will monitor Anemia likely in setting of CKD.  [Time Spent: ___ minutes] : I have spent [unfilled] minutes of time on the encounter which excludes teaching and separately reported services.

## 2024-09-19 NOTE — HISTORY OF PRESENT ILLNESS
[PMH Reviewed and Updated] : past medical history reviewed and updated [PSH Reviewed and Updated] : past surgical history reviewed and updated [Family History Reviewed and Updated] : family history reviewed and updated [Medication and Allergies Reconciled] : medication and allergies reconciled [___ Daughters] : [unfilled] daughter(s) [Compliant with medications] : compliant with medications [Children] : children [Does not drive] : does not drive [No falls in past year] : Patient reported no falls in the past year [Patient is independent with] : bathing [Independent] : transferring/mobility [Full assistance needed] : Assistance needed managing medications [FAST Score: ____] : Functional Assessment Scale (FAST) Score: [unfilled] [Smoke Detector] : smoke detector [Grab Bars] : grab bars [Wears Seat Belt] : wears seat belt [Little interest or pleasure doing things] : 1) Little interest or pleasure doing things [Feeling down, depressed, or hopeless] : 2) Feeling down, depressed, or hopeless [0] : 2) Feeling down, depressed, or hopeless: Not at all (0) [PHQ-2 Negative - No further assessment needed] : PHQ-2 Negative - No further assessment needed [Mild] : Stage: Mild [Stable] : Status: Stable [Memory Lapses Or Loss] : stable memory impairment [Patient Observed To Be Agitated] : stable agitation [Hostility Toward Caregivers] : denies aggression [Sleep Disturbances] : stable sleep disturbances [] : denies wandering [Fixed Beliefs Contradicted By Reality (Delusions)] : denies delusions [Difficulty Finding Desired Words] : denies difficulty finding desired words [None] : The patient is currently asymptomatic [With Patient/Caregiver] : , with patient/caregiver [Reviewed no changes] : Reviewed, no changes [Designated Healthcare Proxy] : Designated healthcare proxy [Relationship: ___] : Relationship: [unfilled] [I will adhere to the patient's wishes.] : I will adhere to the patient's wishes. [Time Spent: ___ minutes] : Time Spent: [unfilled] minutes [FreeTextEntry1] : 82 y/o F with PMHX of HTN, primary hypercholesterolemia, diet controlled T2DM, mild Dementia (moca 10/30), hearing loss (non adherent to hearing aids) presents to the practice for a follow up visit.   She is accompanied by her daughter, Lynda, who provided collateral history.  Dementia: In January 2023, the patient went for her daily walk and did not return home for over two hours - her daughter called the police and the patient was found 3 miles from home after she "got lost." Her blood pressure was "200/100" but patient refused to go to ED. Daughter Lynda stated the patient's memory has gradually worsened over the years - more forgetful and difficulty with word finding. Since starting blood pressure medication, her daughter reports her cognition and mood have improved. She had a cognitive evaluation in Feb 2023, with [facility] 10/30. She has been doing better. She is more responsive to conversations and activities. She goes out, has air tag and apple watch around the neighborhood. No complaints. CT imaging was not pursued at last visit. SW was declined as well. Independent with most ADL's, needs assistance from daughter with IADL's.  She has lived with daughter, Lynda, over 25 years. Also has two sons, who are both not in [place]. Has six grandchildren. . 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 Never smoker. Denies alcohol use/illicit drug use. History of exposure to second hand smoke. Maltese. Her room in basement so the patient climbs stairs several times a day- no assistive device for ambulation. Not driving.  Behvioral symptoms of dementia: none  She is losing track of time. She picks her own clothing appropriately. Assistance with IADLS, but not adls. Watches tv, game shows. She walks around the neighborhood   Thrombocytosis Plt: 459, previously have been high (390, 430, 446). Asymptomatic. Will monitor CBC for now. If uptrending, will send further workup.    HTN: Over 20 year hx of HTN, patient had self-discontinued medication. In 2023, was started on amlodipine. Daughter monitors bp at home. states that the BP is usually in 130's 140's.  Today in clinic BP was measured to be 145/60. Patient states she sometimes gets dizzy in the morning after she gets out of bed, no palpitations, nausea vomiting or chest pain during these episodes, no syncope of fall.   Dizziness:  Patient states she sometimes gets dizzy in the morning after she gets out of bed, no palpitations, nausea vomiting or chest pain during these episodes, no syncope of fall.  -Compression stockings -Take time to get out of bed, sit for a while before standing in the morning.  CKD stage 3b: Likely from chronic HTN disease. GFR 42, Cr 1.28 from August 2023, stable.  Monitor on metabolic panel serially.  HLD: Patient has been on Lipitor. No side effects. LDL of 82 on Nov 16 2023.   Osteoporosis: DEXA was ordered  last year, patient has not done this. She has had no apparent history of osteoporosis or osteopenia as per daughter and chart review.  DM: A1c 7.1, On januvia 25 mg   Hearing loss: Chronic hearing loss, resistant to wearing hearing aids. Pocket talker was introduced and she utilized it but we are not sure if she is appreciating it. Advised daughter to get one for home just in case there is an emergency and she needs to communicate with her. [de-identified] : some assistance, but mostly independent.  [FreeTextEntry7] : takes from weekly pill box am and pm [[id number]] : 0 [AdvancecareDate] : 11/14/2023 [FreeTextEntry4] : Introduced MOLST form, explained need to have MOLST on board at least for CPR and breathing. Daughter wants to explore with family first before filling out. HCP form filled out and uploaded

## 2024-09-19 NOTE — HISTORY OF PRESENT ILLNESS
[PMH Reviewed and Updated] : past medical history reviewed and updated [PSH Reviewed and Updated] : past surgical history reviewed and updated [Family History Reviewed and Updated] : family history reviewed and updated [Medication and Allergies Reconciled] : medication and allergies reconciled [___ Daughters] : [unfilled] daughter(s) [Compliant with medications] : compliant with medications [Children] : children [Does not drive] : does not drive [No falls in past year] : Patient reported no falls in the past year [Patient is independent with] : bathing [Independent] : transferring/mobility [Full assistance needed] : Assistance needed managing medications [FAST Score: ____] : Functional Assessment Scale (FAST) Score: [unfilled] [Smoke Detector] : smoke detector [Grab Bars] : grab bars [Wears Seat Belt] : wears seat belt [Little interest or pleasure doing things] : 1) Little interest or pleasure doing things [Feeling down, depressed, or hopeless] : 2) Feeling down, depressed, or hopeless [0] : 2) Feeling down, depressed, or hopeless: Not at all (0) [PHQ-2 Negative - No further assessment needed] : PHQ-2 Negative - No further assessment needed [Mild] : Stage: Mild [Stable] : Status: Stable [Memory Lapses Or Loss] : stable memory impairment [Patient Observed To Be Agitated] : stable agitation [Hostility Toward Caregivers] : denies aggression [Sleep Disturbances] : stable sleep disturbances [] : denies wandering [Fixed Beliefs Contradicted By Reality (Delusions)] : denies delusions [Difficulty Finding Desired Words] : denies difficulty finding desired words [None] : The patient is currently asymptomatic [With Patient/Caregiver] : , with patient/caregiver [Reviewed no changes] : Reviewed, no changes [Designated Healthcare Proxy] : Designated healthcare proxy [Relationship: ___] : Relationship: [unfilled] [I will adhere to the patient's wishes.] : I will adhere to the patient's wishes. 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   She is accompanied by her daughter, Lynda, who provided collateral history.  Dementia: In January 2023, the patient went for her daily walk and did not return home for over two hours - her daughter called the police and the patient was found 3 miles from home after she "got lost." Her blood pressure was "200/100" but patient refused to go to ED. Daughter Lynda stated the patient's memory has gradually worsened over the years - more forgetful and difficulty with word finding. Since starting blood pressure medication, her daughter reports her cognition and mood have improved. She had a cognitive evaluation in Feb 2023, with [facility] 10/30. She has been doing better. She is more responsive to conversations and activities. She goes out, has air tag and apple watch around the neighborhood. No complaints. CT imaging was not pursued at last visit. SW was declined as well. Independent with most ADL's, needs assistance from daughter with IADL's.  She has lived with daughter, Lynda, over 25 years. Also has two sons, who are both not in [place]. Has six grandchildren. . 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 Never smoker. Denies alcohol use/illicit drug use. History of exposure to second hand smoke. Uzbek. Her room in basement so the patient climbs stairs several times a day- no assistive device for ambulation. Not driving.  Behvioral symptoms of dementia: none  She is losing track of time. She picks her own clothing appropriately. Assistance with IADLS, but not adls. Watches tv, game shows. She walks around the neighborhood   Thrombocytosis Plt: 459, previously have been high (390, 430, 446). Asymptomatic. Will monitor CBC for now. If uptrending, will send further workup.    HTN: Over 20 year hx of HTN, patient had self-discontinued medication. In 2023, was started on amlodipine. Daughter monitors bp at home. states that the BP is usually in 130's 140's.  Today in clinic BP was measured to be 145/60. Patient states she sometimes gets dizzy in the morning after she gets out of bed, no palpitations, nausea vomiting or chest pain during these episodes, no syncope of fall.   Dizziness:  Patient states she sometimes gets dizzy in the morning after she gets out of bed, no palpitations, nausea vomiting or chest pain during these episodes, no syncope of fall.  -Compression stockings -Take time to get out of bed, sit for a while before standing in the morning.  CKD stage 3b: Likely from chronic HTN disease. GFR 42, Cr 1.28 from August 2023, stable.  Monitor on metabolic panel serially.  HLD: Patient has been on Lipitor. No side effects. LDL of 82 on Nov 16 2023.   Osteoporosis: DEXA was ordered  last year, patient has not done this. She has had no apparent history of osteoporosis or osteopenia as per daughter and chart review.  DM: A1c 7.1, On januvia 25 mg   Hearing loss: Chronic hearing loss, resistant to wearing hearing aids. Pocket talker was introduced and she utilized it but we are not sure if she is appreciating it. Advised daughter to get one for home just in case there is an emergency and she needs to communicate with her. [de-identified] : some assistance, but mostly independent.  [FreeTextEntry7] : takes from weekly pill box am and pm [[id number]] : 0 [AdvancecareDate] : 11/14/2023 [FreeTextEntry4] : Introduced MOLST form, explained need to have MOLST on board at least for CPR and breathing. Daughter wants to explore with family first before filling out. HCP form filled out and uploaded

## 2024-12-10 ENCOUNTER — APPOINTMENT (OUTPATIENT)
Dept: GERIATRICS | Facility: CLINIC | Age: 84
End: 2024-12-10
Payer: MEDICARE

## 2024-12-10 VITALS
HEIGHT: 57 IN | OXYGEN SATURATION: 98 % | HEART RATE: 84 BPM | TEMPERATURE: 97.2 F | DIASTOLIC BLOOD PRESSURE: 77 MMHG | RESPIRATION RATE: 16 BRPM | WEIGHT: 117 LBS | BODY MASS INDEX: 25.24 KG/M2 | SYSTOLIC BLOOD PRESSURE: 135 MMHG

## 2024-12-10 DIAGNOSIS — E11.9 TYPE 2 DIABETES MELLITUS W/OUT COMPLICATIONS: ICD-10-CM

## 2024-12-10 DIAGNOSIS — E78.5 HYPERLIPIDEMIA, UNSPECIFIED: ICD-10-CM

## 2024-12-10 DIAGNOSIS — F03.90 UNSPECIFIED DEMENTIA W/OUT BEHAVIORAL DISTURBANCE: ICD-10-CM

## 2024-12-10 DIAGNOSIS — I10 ESSENTIAL (PRIMARY) HYPERTENSION: ICD-10-CM

## 2024-12-10 PROCEDURE — 99215 OFFICE O/P EST HI 40 MIN: CPT | Mod: GC

## 2025-05-12 ENCOUNTER — RX RENEWAL (OUTPATIENT)
Age: 85
End: 2025-05-12

## 2025-06-03 ENCOUNTER — APPOINTMENT (OUTPATIENT)
Dept: GERIATRICS | Facility: CLINIC | Age: 85
End: 2025-06-03
Payer: MEDICARE

## 2025-06-03 VITALS
RESPIRATION RATE: 16 BRPM | SYSTOLIC BLOOD PRESSURE: 156 MMHG | BODY MASS INDEX: 25.67 KG/M2 | HEIGHT: 57 IN | WEIGHT: 119 LBS | OXYGEN SATURATION: 96 % | HEART RATE: 105 BPM | DIASTOLIC BLOOD PRESSURE: 73 MMHG | TEMPERATURE: 98.2 F

## 2025-06-03 VITALS — HEART RATE: 102 BPM

## 2025-06-03 VITALS — DIASTOLIC BLOOD PRESSURE: 70 MMHG | SYSTOLIC BLOOD PRESSURE: 135 MMHG

## 2025-06-03 DIAGNOSIS — F03.90 UNSPECIFIED DEMENTIA W/OUT BEHAVIORAL DISTURBANCE: ICD-10-CM

## 2025-06-03 DIAGNOSIS — E78.5 HYPERLIPIDEMIA, UNSPECIFIED: ICD-10-CM

## 2025-06-03 DIAGNOSIS — I10 ESSENTIAL (PRIMARY) HYPERTENSION: ICD-10-CM

## 2025-06-03 DIAGNOSIS — E11.9 TYPE 2 DIABETES MELLITUS W/OUT COMPLICATIONS: ICD-10-CM

## 2025-06-03 LAB
ANION GAP SERPL CALC-SCNC: 15 MMOL/L
BASOPHILS # BLD AUTO: 0.16 K/UL
BASOPHILS NFR BLD AUTO: 1.6 %
BUN SERPL-MCNC: 12 MG/DL
CALCIUM SERPL-MCNC: 10.1 MG/DL
CHLORIDE SERPL-SCNC: 101 MMOL/L
CHOLEST SERPL-MCNC: 176 MG/DL
CO2 SERPL-SCNC: 21 MMOL/L
CREAT SERPL-MCNC: 1.23 MG/DL
EGFRCR SERPLBLD CKD-EPI 2021: 43 ML/MIN/1.73M2
EOSINOPHIL # BLD AUTO: 0.38 K/UL
EOSINOPHIL NFR BLD AUTO: 3.9 %
GLUCOSE SERPL-MCNC: 253 MG/DL
HCT VFR BLD CALC: 40.5 %
HDLC SERPL-MCNC: 59 MG/DL
HGB BLD-MCNC: 12.9 G/DL
IMM GRANULOCYTES NFR BLD AUTO: 0.1 %
LDLC SERPL-MCNC: 95 MG/DL
LYMPHOCYTES # BLD AUTO: 2.4 K/UL
LYMPHOCYTES NFR BLD AUTO: 24.6 %
MAN DIFF?: NORMAL
MCHC RBC-ENTMCNC: 28 PG
MCHC RBC-ENTMCNC: 31.9 G/DL
MCV RBC AUTO: 88 FL
MONOCYTES # BLD AUTO: 0.96 K/UL
MONOCYTES NFR BLD AUTO: 9.8 %
NEUTROPHILS # BLD AUTO: 5.85 K/UL
NEUTROPHILS NFR BLD AUTO: 60 %
NONHDLC SERPL-MCNC: 117 MG/DL
PLATELET # BLD AUTO: 422 K/UL
POTASSIUM SERPL-SCNC: 4.7 MMOL/L
RBC # BLD: 4.6 M/UL
RBC # FLD: 13.2 %
SODIUM SERPL-SCNC: 138 MMOL/L
TRIGL SERPL-MCNC: 126 MG/DL
TSH SERPL-ACNC: 0.82 UIU/ML
WBC # FLD AUTO: 9.76 K/UL

## 2025-06-03 PROCEDURE — 99215 OFFICE O/P EST HI 40 MIN: CPT | Mod: GC

## 2025-06-04 LAB
ESTIMATED AVERAGE GLUCOSE: 171 MG/DL
HBA1C MFR BLD HPLC: 7.6 %

## 2025-06-25 ENCOUNTER — RX RENEWAL (OUTPATIENT)
Age: 85
End: 2025-06-25